# Patient Record
Sex: FEMALE | Race: WHITE
[De-identification: names, ages, dates, MRNs, and addresses within clinical notes are randomized per-mention and may not be internally consistent; named-entity substitution may affect disease eponyms.]

---

## 2021-07-07 ENCOUNTER — HOSPITAL ENCOUNTER (EMERGENCY)
Dept: HOSPITAL 94 - ER | Age: 31
Discharge: HOME | End: 2021-07-07
Payer: MEDICAID

## 2021-07-07 VITALS — SYSTOLIC BLOOD PRESSURE: 103 MMHG | DIASTOLIC BLOOD PRESSURE: 67 MMHG

## 2021-07-07 VITALS — WEIGHT: 153.33 LBS | BODY MASS INDEX: 24.64 KG/M2 | HEIGHT: 66 IN

## 2021-07-07 DIAGNOSIS — Z79.899: ICD-10-CM
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
## 2021-07-07 DIAGNOSIS — Z90.89: ICD-10-CM
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
## 2021-07-07 DIAGNOSIS — S50.01XA: Primary | ICD-10-CM
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
## 2021-07-07 DIAGNOSIS — M79.601: ICD-10-CM

## 2021-07-07 DIAGNOSIS — F12.90: ICD-10-CM

## 2021-07-07 PROCEDURE — 99283 EMERGENCY DEPT VISIT LOW MDM: CPT

## 2021-07-07 PROCEDURE — 73080 X-RAY EXAM OF ELBOW: CPT
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
